# Patient Record
Sex: MALE | Race: WHITE | NOT HISPANIC OR LATINO | Employment: OTHER | URBAN - METROPOLITAN AREA
[De-identification: names, ages, dates, MRNs, and addresses within clinical notes are randomized per-mention and may not be internally consistent; named-entity substitution may affect disease eponyms.]

---

## 2023-09-26 PROBLEM — I48.19 ATRIAL FIBRILLATION, PERSISTENT (MULTI): Status: ACTIVE | Noted: 2023-09-26

## 2023-09-26 RX ORDER — ERGOCALCIFEROL (VITAMIN D2) 50 MCG
CAPSULE ORAL
COMMUNITY
End: 2023-10-05

## 2023-09-26 RX ORDER — ATORVASTATIN CALCIUM 10 MG/1
TABLET, FILM COATED ORAL
COMMUNITY

## 2023-09-26 RX ORDER — TAMSULOSIN HYDROCHLORIDE 0.4 MG/1
CAPSULE ORAL
COMMUNITY

## 2023-09-26 RX ORDER — UREA 10 %
LOTION (ML) TOPICAL
COMMUNITY

## 2023-09-26 RX ORDER — VIT C/E/ZN/COPPR/LUTEIN/ZEAXAN 250MG-90MG
CAPSULE ORAL
COMMUNITY

## 2023-09-26 RX ORDER — AMIODARONE HYDROCHLORIDE 200 MG/1
TABLET ORAL
COMMUNITY

## 2023-09-26 RX ORDER — AMLODIPINE BESYLATE 2.5 MG/1
TABLET ORAL
COMMUNITY

## 2023-09-26 RX ORDER — LANOLIN ALCOHOL/MO/W.PET/CERES
CREAM (GRAM) TOPICAL
COMMUNITY

## 2023-09-26 RX ORDER — MULTIVITAMIN
TABLET ORAL
COMMUNITY

## 2023-10-02 ENCOUNTER — OFFICE VISIT (OUTPATIENT)
Dept: NEUROSURGERY | Facility: CLINIC | Age: 88
End: 2023-10-02
Payer: MEDICARE

## 2023-10-02 VITALS
WEIGHT: 167.9 LBS | BODY MASS INDEX: 25.45 KG/M2 | SYSTOLIC BLOOD PRESSURE: 125 MMHG | HEART RATE: 83 BPM | DIASTOLIC BLOOD PRESSURE: 81 MMHG | HEIGHT: 68 IN | TEMPERATURE: 97.3 F

## 2023-10-02 DIAGNOSIS — S22.080G T12 COMPRESSION FRACTURE, WITH DELAYED HEALING, SUBSEQUENT ENCOUNTER: Primary | ICD-10-CM

## 2023-10-02 PROCEDURE — 1125F AMNT PAIN NOTED PAIN PRSNT: CPT | Performed by: PHYSICIAN ASSISTANT

## 2023-10-02 PROCEDURE — 99214 OFFICE O/P EST MOD 30 MIN: CPT | Performed by: PHYSICIAN ASSISTANT

## 2023-10-02 PROCEDURE — 1036F TOBACCO NON-USER: CPT | Performed by: PHYSICIAN ASSISTANT

## 2023-10-02 PROCEDURE — 1159F MED LIST DOCD IN RCRD: CPT | Performed by: PHYSICIAN ASSISTANT

## 2023-10-02 RX ORDER — FLUOROURACIL 50 MG/G
5 CREAM TOPICAL DAILY
COMMUNITY
Start: 2023-01-25

## 2023-10-02 RX ORDER — SENNOSIDES 8.6 MG/1
1 TABLET ORAL DAILY
COMMUNITY
Start: 2023-08-30

## 2023-10-02 ASSESSMENT — PATIENT HEALTH QUESTIONNAIRE - PHQ9
2. FEELING DOWN, DEPRESSED OR HOPELESS: NOT AT ALL
SUM OF ALL RESPONSES TO PHQ9 QUESTIONS 1 & 2: 0
1. LITTLE INTEREST OR PLEASURE IN DOING THINGS: NOT AT ALL

## 2023-10-02 ASSESSMENT — LIFESTYLE VARIABLES
HOW OFTEN DO YOU HAVE SIX OR MORE DRINKS ON ONE OCCASION: MONTHLY
AUDIT-C TOTAL SCORE: 3
HOW OFTEN DO YOU HAVE A DRINK CONTAINING ALCOHOL: MONTHLY OR LESS
SKIP TO QUESTIONS 9-10: 0
HOW MANY STANDARD DRINKS CONTAINING ALCOHOL DO YOU HAVE ON A TYPICAL DAY: 1 OR 2

## 2023-10-02 ASSESSMENT — ENCOUNTER SYMPTOMS
OCCASIONAL FEELINGS OF UNSTEADINESS: 1
DEPRESSION: 0
LOSS OF SENSATION IN FEET: 1

## 2023-10-02 ASSESSMENT — PAIN SCALES - GENERAL: PAINLEVEL: 5

## 2023-10-02 NOTE — PROGRESS NOTES
[facility] [place]  Department of Neurological Surgery  Established Patient Visit    History of Present Illness  Eros Ibrahim is a 89 y.o. year old male who presents to the spine clinic in follow up with T12 compression fracture.  [place] [facility] [place] has been utilizing TLSO brace until last week as instructed by rehab facility.  Denies any new radiating pain into his lower lumbar or legs though continues to endorse mild level of low back pain rated 5/10.  Denies bowel or bladder symptoms    Patient's BMI is Body mass index is 25.91 kg/m².     systems reviewed and negative other than what is listed in the history of present illness    Patient Active Problem List   Diagnosis    Atrial fibrillation, persistent (CMS/HCC)     No past medical history on file.  No past surgical history on file.  Social History     Tobacco Use    Smoking status: Former     Packs/day: 1     Types: Cigarettes, Pipe, Cigars     Quit date:      Years since quittin.7    Smokeless tobacco: Never   Substance Use Topics    Alcohol use: Yes     Comment: ocassionally     family history is not on file.    Current Outpatient Medications:     amiodarone (Pacerone) 200 mg tablet, , Disp: , Rfl:     amLODIPine (Norvasc) 2.5 mg tablet, , Disp: , Rfl:     atorvastatin (Lipitor) 10 mg tablet, , Disp: , Rfl:     cholecalciferol (Vitamin D3) 25 MCG (1000 UT) capsule, , Disp: , Rfl:     cyanocobalamin (Vitamin B-12) 1,000 mcg tablet, , Disp: , Rfl:     ergocalciferol (Vitamin D-2) 50 MCG (2000 UT) capsule capsule, , Disp: , Rfl:     ferrous sulfate ER (Slow Release Iron) 140 (45 Fe) MG ER tablet, Do not crush, chew, or split., Disp: , Rfl:     fluorouracil (Efudex) 5 % cream, Apply 5 Applications topically once daily., Disp: , Rfl:     multivitamin tablet, , Disp: , Rfl:     rivaroxaban (Xarelto) 20 mg tablet, Take with food., Disp: , Rfl:     Senna Laxative 8.6 mg tablet, Take 1 tablet (8.6  mg) by mouth once daily., Disp: , Rfl:     tamsulosin (Flomax) 0.4 mg 24 hr capsule, once daily in the morning. Take before meals., Disp: , Rfl:   No Known Allergies    Physical Examination:    General: Well developed, awake/alert/oriented x3, no distress, alert and cooperative  Skin: Warm and dry, no lesions, no rashes  ENMT: Mucous membranes moist, no apparent injury, no lesions seen  Head/Neck: Neck Supple, no apparent injury  Respiratory/Thorax: Normal breath sounds with good chest expansion, thorax symmetric  Cardiovascular: No pitting edema, no JVD    Motor Strength: 5/5 Throughout all extremities    Muscle Bulk: Normal and symmetric in all extremities    Posture:   -- Cervical: Normal  -- Thoracic: Normal  -- Lumbar : Normal  Paraspinal muscle spasm/tenderness absent.       Sensation: intact to light touch  Mild midline tenderness present approximately T12    Results:  I personally reviewed and interpreted the imaging results which included mild T12 compression fracture on CT scan with minimal to no retropulsion    Assessment and Plan:      Eros Ibrahim is a 89 y.o. year old male who presents to the spine clinic in follow up with T12 compression fracture.  He is previously seen in [facility] [place] has been utilizing TLSO brace until last week as instructed by rehab facility.  Denies any new radiating pain into his lower lumbar or legs though continues to endorse mild level of low back pain rated 5/10.  Denies bowel or bladder symptoms.  Recommended he utilize the brace when uncomfortable and to obtain updated x-rays of the thoracic spine for surveillance of compression fracture.        I have reviewed all prior documentation and reviewed the electronic medical record since admission. I have personally have reviewed all advanced imaging not just the reports and used my interpretation as documented as the relevant findings. I have reviewed the risks and benefits of all treatment recommendations  listed in this note with the patient and family. I spent a total of 30 minutes in service to this patient's care during this date of service.      The above clinical summary has been dictated with voice recognition software. It has not been proofread for grammatical errors, typographical mistakes, or other semantic inconsistencies.    Thank you for visiting our office today. It was our pleasure to take part in your healthcare.     Do not hesitate to call with any questions regarding your plan of care after leaving at [phone] M-F 8am-4pm.     To clinicians, thank you very much for this kind referral. It is a privilege to partner with you in the care of your patients. My office would be delighted to assist you with any further consultations or with questions regarding the plan of care outlined. Do not hesitate to call the office or contact me directly.       Sincerely,      HARVINDER Danielle, PASteveC  Associate Physician Assistant, Neurosurgery  Clinical   [facility] [place] [address]    Phone: [phone]  Fax: [phone]

## 2023-10-03 NOTE — PROGRESS NOTES
Subjective   Patient ID: Eros Ibrahim is a 89 y.o. male who presents for No chief complaint on file..  Mr. Ibrahim is a 89M PMH afib, HTN, HLD, urinary frequency who is referred to pulmonary for pulmonary HTN by hospital discharging doctor. Also follows with cardiologist Dr. Cochran.     #Abnormal RHC  He was admitted with a fall and a spine fracture. He then went to rehab.   While admitted, a syncope workup was started and an elevated RVSP was noted. RHC was performed and these were reportedly lower than expected on TTE. I do not have access to the waveforms.   He gets tired easily. When he is walking he can't walk as far before becoming symptomatic. This has been since discharge. He doesn't have chest pain with this, only tiredness and some dyspnea.   He denies coughing. He doesn't wheeze.       Family History:  No lung disease history    Social History:  Quit 1961, started age 15, 1 ppd  Worked at [employer] -  for [employer]        Review of Systems   Constitutional:  Positive for chills and fatigue. Negative for fever and unexpected weight change.        Weight loss of 10 lbs with hospitalization   HENT:  Positive for rhinorrhea. Negative for congestion and sore throat.    Eyes:  Negative for visual disturbance.   Respiratory:  Negative for cough, chest tightness and wheezing.    Cardiovascular:  Negative for chest pain and palpitations.   Gastrointestinal:  Negative for abdominal pain.   Musculoskeletal:  Positive for back pain.   Allergic/Immunologic: Negative for environmental allergies.   Neurological:  Positive for weakness.   Hematological:  Negative for adenopathy.       Objective   Physical Exam  Constitutional:       Appearance: Normal appearance.   HENT:      Head: Normocephalic and atraumatic.      Ears:      Comments: Bilateral hearing aids     Nose: No rhinorrhea.      Mouth/Throat:      Mouth: Mucous membranes are moist.      Pharynx: No posterior oropharyngeal erythema.   Eyes:       General: No scleral icterus.     Extraocular Movements: Extraocular movements intact.   Cardiovascular:      Rate and Rhythm: Normal rate and regular rhythm.      Heart sounds: Normal heart sounds. No murmur heard.  Pulmonary:      Effort: Pulmonary effort is normal. No respiratory distress.      Breath sounds: Normal breath sounds. No wheezing or rhonchi.   Abdominal:      General: Abdomen is flat.      Comments: Back brace covering most of abdomen   Musculoskeletal:      Cervical back: Normal range of motion and neck supple. No tenderness.   Lymphadenopathy:      Cervical: No cervical adenopathy.   Skin:     General: Skin is warm and dry.   Neurological:      General: No focal deficit present.      Mental Status: He is alert and oriented to person, place, and time.      Gait: Gait normal.   Psychiatric:         Mood and Affect: Mood normal.         Behavior: Behavior normal.         V/Q 8/2023  IMPRESSION:  Low probability of acute pulmonary embolism.    TTE 8/2023  PHYSICIAN INTERPRETATION:  Left Ventricle: Left ventricular systolic function is normal, with an estimated ejection fraction of 65%. There are no regional wall motion abnormalities. The left ventricular cavity size is normal. Spectral Doppler shows an impaired relaxation pattern of left ventricular diastolic filling.  Left Atrium: The left atrium is normal in size.  Right Ventricle: The right ventricle is normal in size. There is normal right ventricular global systolic function.  Right Atrium: The right atrium is normal in size.  Aortic Valve: The aortic valve is trileaflet. There is mild aortic valve cusp calcification. There is evidence of mildly elevated transaortic gradients consistent with sclerosis of the aortic valve.  There is mild aortic valve regurgitation.  Mitral Valve: The mitral valve is normal in structure. There is mild to moderate mitral annular calcification. There is mild mitral valve regurgitation.  Tricuspid Valve: The  tricuspid valve is structurally normal. There is moderate tricuspid regurgitation. The Doppler estimated RVSP is severely elevated at 61.1 mmHg.  Pulmonic Valve: The pulmonic valve is structurally normal. There is no indication of pulmonic valve regurgitation.  Pericardium: There is no pericardial effusion noted.  Aorta: The aortic root is normal.  In comparison to the previous echocardiogram(s): There are no prior studies on this patient for comparison purposes.        CONCLUSIONS:  1. Left ventricular systolic function is normal with a 65% estimated ejection fraction.  2. Spectral Doppler shows an impaired relaxation pattern of left ventricular diastolic filling.  3. Moderate tricuspid regurgitation.  4. Severely elevated right ventricular systolic pressure.  5. Aortic valve sclerosis.  6. Mild aortic valve regurgitation.    CT chest 8/2023  IMPRESSION:  Newly seen small bilateral pleural effusions with bandlike reticular  changes which may be due to atelectasis versus mild vascular  congestion/edema.     A non-calcified pulmonary nodule/ multiple non-calcified pulmonary  nodules measuring less than 6 mm, likely benign. No further follow-up  is required, however, if the patient has high risk factors for  primary lung malignancy, follow-up noncontrast CT scan chest in 12  months may be obtained. (Frankie Cline et al., Guidelines for  management of incidental pulmonary nodules detected on CT images:  From the Fleischner Society 2017, Radiology. 2017 Jul;284  (1):228-243.) [email or web address]     Borderline caliber right main pulmonary artery. Correlate for  features of pulmonary arterial hypertension.     7 cm simple liver cysts.     Liver hyperattenuation. Differential considerations include normal  variation, or diffuse liver abnormality such as iron overload or  amiodarone toxicity. Recommend correlation with liver function tests.     Minimal increased deformity T12 vertebral body compression fracture.      Athero sclerosis.    Assessment/Plan   Diagnoses and all orders for this visit:  Closed fracture of twelfth thoracic vertebra with routine healing, unspecified fracture morphology, subsequent encounter  -     Disability Placard  Rhinorrhea  -     fluticasone (Flonase) 50 mcg/actuation nasal spray; Administer 2 sprays into each nostril once daily. Shake gently. Before first use, prime pump. After use, clean tip and replace cap.  -     Disability Placard  Dyspnea, unspecified type  -     Disability Placard  Chronic atrial fibrillation (CMS/HCC)  -     Disability Placard         Mr. Ibrahim is a 89M PMH afib, HTN, HLD, urinary frequency who is referred to pulmonary for pulmonary HTN.    #Pulmonary HTN  Patient with mild pulmonary HTN on 8/2023 RHC with PW 2 reported, PA 41/9 (21), CO 7.5, CI 4.0. He also had moderate TR on his TTE on admission. He is following with cardiology.   He does have some fatigue and dyspnea on walking after his hospital stay which is a bit of a confounder.   I would favor group 2 disease as he does not have any significant pulmonary parenchymal disease, no history of a PE, no current leg swelling on DOAC, no history of autoimmune disease.   We discussed that evaluation and management of pulmonary HTN and that following closely with cardiology is indicated. I did discuss that it requires specialist evaluation by a pHTN expert and can refer him if he is interested in evaluating more given his current co-morbidities and age.   He plans to follow up with his cardiologist closer to home when he moves back later this month.   He requested a disability placard which he qualifies with this diagnosis and his current spine fracture.     #Pulmonary nodules  These are small and he is a low risk candidate. He can have a repeat CT chest at 12 months if interested, but likely these are benign etiologies He can have this CT chest with his PCP at his home.     #Rhinorrhea  He feels this causes him to cough  so I ordered flonase for him to use regularly.     RTC PRN

## 2023-10-05 ENCOUNTER — OFFICE VISIT (OUTPATIENT)
Dept: PULMONOLOGY | Facility: CLINIC | Age: 88
End: 2023-10-05
Payer: MEDICARE

## 2023-10-05 VITALS
TEMPERATURE: 97.4 F | SYSTOLIC BLOOD PRESSURE: 118 MMHG | BODY MASS INDEX: 26.16 KG/M2 | HEART RATE: 66 BPM | WEIGHT: 172.6 LBS | OXYGEN SATURATION: 94 % | HEIGHT: 68 IN | RESPIRATION RATE: 18 BRPM | DIASTOLIC BLOOD PRESSURE: 71 MMHG

## 2023-10-05 DIAGNOSIS — I48.20 CHRONIC ATRIAL FIBRILLATION (MULTI): ICD-10-CM

## 2023-10-05 DIAGNOSIS — R06.00 DYSPNEA, UNSPECIFIED TYPE: ICD-10-CM

## 2023-10-05 DIAGNOSIS — J34.89 RHINORRHEA: ICD-10-CM

## 2023-10-05 DIAGNOSIS — S22.089D CLOSED FRACTURE OF TWELFTH THORACIC VERTEBRA WITH ROUTINE HEALING, UNSPECIFIED FRACTURE MORPHOLOGY, SUBSEQUENT ENCOUNTER: Primary | ICD-10-CM

## 2023-10-05 PROCEDURE — 1125F AMNT PAIN NOTED PAIN PRSNT: CPT | Performed by: INTERNAL MEDICINE

## 2023-10-05 PROCEDURE — 1159F MED LIST DOCD IN RCRD: CPT | Performed by: INTERNAL MEDICINE

## 2023-10-05 PROCEDURE — 1160F RVW MEDS BY RX/DR IN RCRD: CPT | Performed by: INTERNAL MEDICINE

## 2023-10-05 PROCEDURE — 1036F TOBACCO NON-USER: CPT | Performed by: INTERNAL MEDICINE

## 2023-10-05 PROCEDURE — 99214 OFFICE O/P EST MOD 30 MIN: CPT | Performed by: INTERNAL MEDICINE

## 2023-10-05 RX ORDER — FLUTICASONE PROPIONATE 50 MCG
2 SPRAY, SUSPENSION (ML) NASAL DAILY
Qty: 16 G | Refills: 6 | Status: SHIPPED | OUTPATIENT
Start: 2023-10-05 | End: 2024-04-02

## 2023-10-05 ASSESSMENT — ENCOUNTER SYMPTOMS
FATIGUE: 1
BACK PAIN: 1
SORE THROAT: 0
PALPITATIONS: 0
WEAKNESS: 1
OCCASIONAL FEELINGS OF UNSTEADINESS: 1
LOSS OF SENSATION IN FEET: 1
COUGH: 0
CHILLS: 1
DEPRESSION: 0
RHINORRHEA: 1
FEVER: 0
WHEEZING: 0
UNEXPECTED WEIGHT CHANGE: 0
ABDOMINAL PAIN: 0
CHEST TIGHTNESS: 0
ADENOPATHY: 0

## 2023-10-05 ASSESSMENT — PAIN SCALES - GENERAL: PAINLEVEL: 4

## 2023-10-05 ASSESSMENT — COLUMBIA-SUICIDE SEVERITY RATING SCALE - C-SSRS
6. HAVE YOU EVER DONE ANYTHING, STARTED TO DO ANYTHING, OR PREPARED TO DO ANYTHING TO END YOUR LIFE?: NO
2. HAVE YOU ACTUALLY HAD ANY THOUGHTS OF KILLING YOURSELF?: NO
1. IN THE PAST MONTH, HAVE YOU WISHED YOU WERE DEAD OR WISHED YOU COULD GO TO SLEEP AND NOT WAKE UP?: NO

## 2023-10-05 ASSESSMENT — PATIENT HEALTH QUESTIONNAIRE - PHQ9
1. LITTLE INTEREST OR PLEASURE IN DOING THINGS: NOT AT ALL
2. FEELING DOWN, DEPRESSED OR HOPELESS: NOT AT ALL
SUM OF ALL RESPONSES TO PHQ9 QUESTIONS 1 AND 2: 0

## 2023-10-05 NOTE — PATIENT INSTRUCTIONS
Thank you for coming in today! Please call with questions or concerns.    Your CAT scan of your chest showed some small nodules (less than 6 mm). You are at low risk for them to be cancer because you quit smoking so long ago.   If you are interested, you can repeat this CAT scan in 8/2024. You can do this with your primary care doctor if you are interested.     You have some elevated pressures in your heart telling us there was some higher blood pressure in your lungs. This is called pulmonary hypertension. Your heart pressure measures were mild and probably show that this is from your heart disease.   I think you should keep working with your heart doctors to support your heart with medications to help treat this.   If you would like to speak to a pulmonary hypertension expert, they are at [facility] in  and [facility]. We can provide that referral if needed.   This is otherwise an uncommon disease.      I ordered flonase/fluticasone to your mail order pharmacy. Use this nasal spray twice a day regularly to see if it helps with the runny nose.     Return to clinic as needed - ok to see a lung doctor in [place]

## 2023-10-06 ENCOUNTER — TELEPHONE (OUTPATIENT)
Dept: CARDIOLOGY | Facility: CLINIC | Age: 88
End: 2023-10-06
Payer: MEDICARE

## 2023-10-06 DIAGNOSIS — I48.19 ATRIAL FIBRILLATION, PERSISTENT (MULTI): Primary | ICD-10-CM

## 2023-10-06 NOTE — TELEPHONE ENCOUNTER
"PT needs \"thyroid checked\", he sd you wanted him to. Did you need him to get labs or a ref pt to endocrinologist? Please advise.  "

## 2023-10-11 ENCOUNTER — PREP FOR PROCEDURE (OUTPATIENT)
Dept: CARDIOLOGY | Facility: CLINIC | Age: 88
End: 2023-10-11
Payer: MEDICARE

## 2023-10-24 ENCOUNTER — TELEPHONE (OUTPATIENT)
Dept: CARDIOLOGY | Facility: HOSPITAL | Age: 88
End: 2023-10-24

## 2023-10-24 NOTE — TELEPHONE ENCOUNTER
Please let pt know his TSH is normal.  His amiodarone does not appear to eb affecting his thyroid.    He will need that rechecked in 1 yr  SDH

## 2024-01-12 ENCOUNTER — APPOINTMENT (OUTPATIENT)
Dept: CARDIOLOGY | Facility: CLINIC | Age: 89
End: 2024-01-12
Payer: MEDICARE